# Patient Record
Sex: FEMALE | Race: WHITE
[De-identification: names, ages, dates, MRNs, and addresses within clinical notes are randomized per-mention and may not be internally consistent; named-entity substitution may affect disease eponyms.]

---

## 2018-05-29 ENCOUNTER — HOSPITAL ENCOUNTER (EMERGENCY)
Dept: HOSPITAL 12 - ER | Age: 73
Discharge: HOME | End: 2018-05-29
Payer: COMMERCIAL

## 2018-05-29 VITALS — HEIGHT: 63 IN | BODY MASS INDEX: 24.8 KG/M2 | WEIGHT: 140 LBS

## 2018-05-29 VITALS — DIASTOLIC BLOOD PRESSURE: 64 MMHG | SYSTOLIC BLOOD PRESSURE: 141 MMHG
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
## 2018-05-29 DIAGNOSIS — I10: ICD-10-CM
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
## 2018-05-29 DIAGNOSIS — Z79.899: ICD-10-CM

## 2018-05-29 DIAGNOSIS — Z88.6: ICD-10-CM
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
## 2018-05-29 DIAGNOSIS — S20.229A: Primary | ICD-10-CM

## 2018-05-29 DIAGNOSIS — Z79.52: ICD-10-CM

## 2018-05-29 DIAGNOSIS — Z88.5: ICD-10-CM

## 2018-05-29 DIAGNOSIS — S09.90XA: ICD-10-CM

## 2018-05-29 DIAGNOSIS — Z79.891: ICD-10-CM

## 2018-05-29 DIAGNOSIS — E03.9: ICD-10-CM
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
## 2018-05-29 DIAGNOSIS — K21.9: ICD-10-CM

## 2018-05-29 PROCEDURE — A4663 DIALYSIS BLOOD PRESSURE CUFF: HCPCS

## 2019-12-14 ENCOUNTER — HOSPITAL ENCOUNTER (EMERGENCY)
Dept: HOSPITAL 12 - ER | Age: 74
Discharge: ANOTHER HOSPITAL | End: 2019-12-14
Payer: COMMERCIAL

## 2019-12-14 VITALS — WEIGHT: 138 LBS | HEIGHT: 63 IN | BODY MASS INDEX: 24.45 KG/M2

## 2019-12-14 DIAGNOSIS — F31.9: ICD-10-CM

## 2019-12-14 DIAGNOSIS — E87.2: ICD-10-CM

## 2019-12-14 DIAGNOSIS — N18.3: ICD-10-CM

## 2019-12-14 DIAGNOSIS — Z88.8: ICD-10-CM

## 2019-12-14 DIAGNOSIS — K21.9: ICD-10-CM

## 2019-12-14 DIAGNOSIS — R50.9: ICD-10-CM

## 2019-12-14 DIAGNOSIS — Z88.5: ICD-10-CM

## 2019-12-14 DIAGNOSIS — I12.9: ICD-10-CM

## 2019-12-14 DIAGNOSIS — A41.9: Primary | ICD-10-CM

## 2019-12-14 DIAGNOSIS — E03.9: ICD-10-CM

## 2019-12-14 DIAGNOSIS — Z79.899: ICD-10-CM

## 2019-12-14 LAB
ALP SERPL-CCNC: 87 U/L (ref 50–136)
ALT SERPL W/O P-5'-P-CCNC: 13 U/L (ref 14–59)
APPEARANCE UR: CLEAR
AST SERPL-CCNC: 15 U/L (ref 15–37)
BASOPHILS # BLD AUTO: 0 K/UL (ref 0–8)
BASOPHILS NFR BLD AUTO: 0.3 % (ref 0–2)
BILIRUB DIRECT SERPL-MCNC: 0.1 MG/DL (ref 0–0.2)
BILIRUB SERPL-MCNC: 0.3 MG/DL (ref 0.2–1)
BILIRUB UR QL STRIP: NEGATIVE
BUN SERPL-MCNC: 25 MG/DL (ref 7–18)
CHLORIDE SERPL-SCNC: 105 MMOL/L (ref 98–107)
CO2 SERPL-SCNC: 30 MMOL/L (ref 21–32)
COLOR UR: YELLOW
CREAT SERPL-MCNC: 1.4 MG/DL (ref 0.6–1.3)
EOSINOPHIL # BLD AUTO: 0.1 K/UL (ref 0–0.7)
EOSINOPHIL NFR BLD AUTO: 0.8 % (ref 0–7)
GLUCOSE SERPL-MCNC: 144 MG/DL (ref 74–106)
GLUCOSE UR STRIP-MCNC: NEGATIVE MG/DL
HCT VFR BLD AUTO: 34.9 % (ref 31.2–41.9)
HGB BLD-MCNC: 11.3 G/DL (ref 10.9–14.3)
HGB UR QL STRIP: NEGATIVE
KETONES UR STRIP-MCNC: NEGATIVE MG/DL
LEUKOCYTE ESTERASE UR QL STRIP: NEGATIVE
LYMPHOCYTES # BLD AUTO: 0.3 K/UL (ref 20–40)
LYMPHOCYTES NFR BLD AUTO: 3 % (ref 20.5–51.5)
MCH RBC QN AUTO: 30.8 UUG (ref 24.7–32.8)
MCHC RBC AUTO-ENTMCNC: 33 G/DL (ref 32.3–35.6)
MCV RBC AUTO: 94.9 FL (ref 75.5–95.3)
MONOCYTES # BLD AUTO: 0.7 K/UL (ref 2–10)
MONOCYTES NFR BLD AUTO: 7 % (ref 0–11)
NEUTROPHILS # BLD AUTO: 8.5 K/UL (ref 1.8–8.9)
NEUTROPHILS NFR BLD AUTO: 88.9 % (ref 38.5–71.5)
NITRITE UR QL STRIP: NEGATIVE
PH UR STRIP: 7 [PH] (ref 5–8)
PLATELET # BLD AUTO: 206 K/UL (ref 179–408)
POTASSIUM SERPL-SCNC: 3.8 MMOL/L (ref 3.5–5.1)
RBC # BLD AUTO: 3.68 MIL/UL (ref 3.63–4.92)
SP GR UR STRIP: 1.01 (ref 1–1.03)
UROBILINOGEN UR STRIP-MCNC: 0.2 E.U./DL
WBC # BLD AUTO: 9.5 K/UL (ref 3.8–11.8)
WS STN SPEC: 6.8 G/DL (ref 6.4–8.2)

## 2019-12-14 PROCEDURE — 80076 HEPATIC FUNCTION PANEL: CPT

## 2019-12-14 PROCEDURE — 36415 COLL VENOUS BLD VENIPUNCTURE: CPT

## 2019-12-14 PROCEDURE — C1758 CATHETER, URETERAL: HCPCS

## 2019-12-14 PROCEDURE — 83605 ASSAY OF LACTIC ACID: CPT

## 2019-12-14 PROCEDURE — 87400 INFLUENZA A/B EACH AG IA: CPT

## 2019-12-14 PROCEDURE — 84484 ASSAY OF TROPONIN QUANT: CPT

## 2019-12-14 PROCEDURE — 87086 URINE CULTURE/COLONY COUNT: CPT

## 2019-12-14 PROCEDURE — 71045 X-RAY EXAM CHEST 1 VIEW: CPT

## 2019-12-14 PROCEDURE — 81001 URINALYSIS AUTO W/SCOPE: CPT

## 2019-12-14 PROCEDURE — A4663 DIALYSIS BLOOD PRESSURE CUFF: HCPCS

## 2019-12-14 PROCEDURE — 96365 THER/PROPH/DIAG IV INF INIT: CPT

## 2019-12-14 PROCEDURE — 96368 THER/DIAG CONCURRENT INF: CPT

## 2019-12-14 PROCEDURE — 96366 THER/PROPH/DIAG IV INF ADDON: CPT

## 2019-12-14 PROCEDURE — 93005 ELECTROCARDIOGRAM TRACING: CPT

## 2019-12-14 PROCEDURE — 99291 CRITICAL CARE FIRST HOUR: CPT

## 2019-12-14 PROCEDURE — 85025 COMPLETE CBC W/AUTO DIFF WBC: CPT

## 2019-12-14 PROCEDURE — 87040 BLOOD CULTURE FOR BACTERIA: CPT

## 2019-12-14 PROCEDURE — 85730 THROMBOPLASTIN TIME PARTIAL: CPT

## 2019-12-14 PROCEDURE — 80048 BASIC METABOLIC PNL TOTAL CA: CPT

## 2019-12-14 NOTE — NUR
HAND OFF GIVEN TO [place] [facility] (AMRIK)

ETA  AT 2130



DX FEVER

RECEIVING MD : DR VÁZQUEZ



RA

NAD

NSR

R AC G20 INFUSING 2ND BAG OF IVFLUIDS AND PIGGYBACK ABX (VANCO)

TEMP AT 99.7f

KEPT WARM DRY AND COMFORTABLE

## 2019-12-14 NOTE — NUR
TRANSPPORTED TO [facility]

PRN ALS



IVF INFUSING STILL TO R AC G20 (2ND BAG OF SALINE, 500ML)

## 2019-12-14 NOTE — NUR
KAM CALL BACK:



PT TO TRANSFER TO [facility] [phone]

RECEIVEING MD  : DR GURPREET RAYA  AT   [phone]

## 2019-12-14 NOTE — NUR
Patient BIB [id number] from [facility] for c/o high fever at facility. Patient A/Ox4. 
Speech is clear, speaks in complete sentences. No acute neuro deficits noted. 
Respiratory even and unlabored, no cough no sob. Denies any n/v/d.

## 2019-12-14 NOTE — NUR
received hand off and sbar from outgoing day shift 



DX FEVER

ASSESSMENT MD(CRUZ) ON THE LINE WITH [facility]

PENDING CALL BACK FROM RECEIVING MD



FOR TRANSFER TO [place]



PT TEMP 99.7F

PT IS AOX4 ABLE TO SPEAK CLEAR AND COMPLETE SENTENCES

STATES SHE IS NOT SHIVERING ANYMORE



IVF INFUSING AND PATENT, WITH FLUIDS AND ABX


-------------------------------------------------------------------------------

Addendum: 12/14/19 at 1931 by DIOMEDES

-------------------------------------------------------------------------------

IVF INFUSING AND not PATENT, WITH FLUIDS AND ABX



+SIGNS OF INFILTRATION

## 2019-12-14 NOTE — NUR
Becca from [facility] contacted, she stated she will pass the case to the 
admitting team and have them call us back.

## 2019-12-14 NOTE — NUR
D/C IV [place] ON L AC

APPLIED WARMCOMPRESSTO AFFECTED SITE



NOTED +SIGNSOF INFILTRATION ON L AC

+EDEMA ANDERYTHEMA 

+TENDERNESS TO SITE

## 2019-12-31 ENCOUNTER — HOSPITAL ENCOUNTER (EMERGENCY)
Dept: HOSPITAL 54 - ER | Age: 74
LOS: 1 days | Discharge: HOME | End: 2020-01-01
Payer: COMMERCIAL

## 2019-12-31 VITALS — BODY MASS INDEX: 19.16 KG/M2 | WEIGHT: 115 LBS | HEIGHT: 65 IN

## 2019-12-31 DIAGNOSIS — R19.7: ICD-10-CM

## 2019-12-31 DIAGNOSIS — Z88.6: ICD-10-CM

## 2019-12-31 DIAGNOSIS — Z90.49: ICD-10-CM

## 2019-12-31 DIAGNOSIS — E03.9: ICD-10-CM

## 2019-12-31 DIAGNOSIS — I10: ICD-10-CM

## 2019-12-31 DIAGNOSIS — Z90.89: ICD-10-CM

## 2019-12-31 DIAGNOSIS — Z88.5: ICD-10-CM

## 2019-12-31 DIAGNOSIS — R10.31: Primary | ICD-10-CM

## 2019-12-31 LAB
ALBUMIN SERPL BCP-MCNC: 3.5 G/DL (ref 3.4–5)
ALP SERPL-CCNC: 102 U/L (ref 46–116)
ALT SERPL W P-5'-P-CCNC: 22 U/L (ref 12–78)
AST SERPL W P-5'-P-CCNC: 33 U/L (ref 15–37)
BASOPHILS # BLD AUTO: 0 /CMM (ref 0–0.2)
BASOPHILS NFR BLD AUTO: 0.8 % (ref 0–2)
BILIRUB DIRECT SERPL-MCNC: 0.1 MG/DL (ref 0–0.2)
BILIRUB SERPL-MCNC: 0.3 MG/DL (ref 0.2–1)
BUN SERPL-MCNC: 20 MG/DL (ref 7–18)
CALCIUM SERPL-MCNC: 9.8 MG/DL (ref 8.5–10.1)
CHLORIDE SERPL-SCNC: 107 MMOL/L (ref 98–107)
CO2 SERPL-SCNC: 33 MMOL/L (ref 21–32)
CREAT SERPL-MCNC: 1.5 MG/DL (ref 0.6–1.3)
EOSINOPHIL NFR BLD AUTO: 3.1 % (ref 0–6)
GLUCOSE SERPL-MCNC: 110 MG/DL (ref 74–106)
HCT VFR BLD AUTO: 34 % (ref 33–45)
HGB BLD-MCNC: 11.1 G/DL (ref 11.5–14.8)
LIPASE SERPL-CCNC: 242 U/L (ref 73–393)
LYMPHOCYTES NFR BLD AUTO: 1.5 /CMM (ref 0.8–4.8)
LYMPHOCYTES NFR BLD AUTO: 28.2 % (ref 20–44)
MCHC RBC AUTO-ENTMCNC: 33 G/DL (ref 31–36)
MCV RBC AUTO: 95 FL (ref 82–100)
MONOCYTES NFR BLD AUTO: 0.4 /CMM (ref 0.1–1.3)
MONOCYTES NFR BLD AUTO: 7.8 % (ref 2–12)
NEUTROPHILS # BLD AUTO: 3.2 /CMM (ref 1.8–8.9)
NEUTROPHILS NFR BLD AUTO: 60.1 % (ref 43–81)
PH UR STRIP: 8.5 [PH] (ref 5–8)
PLATELET # BLD AUTO: 223 /CMM (ref 150–450)
POTASSIUM SERPL-SCNC: 4.1 MMOL/L (ref 3.5–5.1)
PROT SERPL-MCNC: 6.9 G/DL (ref 6.4–8.2)
RBC # BLD AUTO: 3.61 MIL/UL (ref 4–5.2)
SODIUM SERPL-SCNC: 146 MMOL/L (ref 136–145)
UROBILINOGEN UR STRIP-MCNC: 0.2 EU/DL
WBC NRBC COR # BLD AUTO: 5.4 K/UL (ref 4.3–11)

## 2019-12-31 PROCEDURE — 93005 ELECTROCARDIOGRAM TRACING: CPT

## 2019-12-31 PROCEDURE — 99284 EMERGENCY DEPT VISIT MOD MDM: CPT

## 2019-12-31 PROCEDURE — 83690 ASSAY OF LIPASE: CPT

## 2019-12-31 PROCEDURE — 84484 ASSAY OF TROPONIN QUANT: CPT

## 2019-12-31 PROCEDURE — 80048 BASIC METABOLIC PNL TOTAL CA: CPT

## 2019-12-31 PROCEDURE — 74177 CT ABD & PELVIS W/CONTRAST: CPT

## 2019-12-31 PROCEDURE — 80076 HEPATIC FUNCTION PANEL: CPT

## 2019-12-31 PROCEDURE — 36415 COLL VENOUS BLD VENIPUNCTURE: CPT

## 2019-12-31 PROCEDURE — 87086 URINE CULTURE/COLONY COUNT: CPT

## 2019-12-31 PROCEDURE — 81001 URINALYSIS AUTO W/SCOPE: CPT

## 2019-12-31 PROCEDURE — 85025 COMPLETE CBC W/AUTO DIFF WBC: CPT

## 2019-12-31 NOTE — NUR
PT [id number] RLQ ABD PAIN X 1 HR. -NV, +D. PT AAOX4, VSS, BREATHING EVEN AND 
UNLABORED ON ROOM AIR W/ NAD NOTED. PT CONNECTED TO THE MONITOR AND POX

## 2020-01-01 VITALS — DIASTOLIC BLOOD PRESSURE: 68 MMHG | SYSTOLIC BLOOD PRESSURE: 110 MMHG
